# Patient Record
Sex: FEMALE | Race: WHITE | URBAN - METROPOLITAN AREA
[De-identification: names, ages, dates, MRNs, and addresses within clinical notes are randomized per-mention and may not be internally consistent; named-entity substitution may affect disease eponyms.]

---

## 2023-08-17 ENCOUNTER — APPOINTMENT (RX ONLY)
Dept: URBAN - METROPOLITAN AREA CLINIC 325 | Facility: CLINIC | Age: 68
Setting detail: DERMATOLOGY
End: 2023-08-17

## 2023-08-17 DIAGNOSIS — L81.4 OTHER MELANIN HYPERPIGMENTATION: ICD-10-CM

## 2023-08-17 DIAGNOSIS — L28.1 PRURIGO NODULARIS: ICD-10-CM

## 2023-08-17 PROCEDURE — ? PRESCRIPTION

## 2023-08-17 PROCEDURE — ? MEDICAL CONSULTATION: CHEMICAL PEELS

## 2023-08-17 PROCEDURE — 99203 OFFICE O/P NEW LOW 30 MIN: CPT

## 2023-08-17 PROCEDURE — ? COSMETIC CONSULTATION: IPL

## 2023-08-17 PROCEDURE — ? COUNSELING

## 2023-08-17 PROCEDURE — ? PRODUCT LINE (OFFICE PRODUCTS)

## 2023-08-17 RX ORDER — SILVER SULFADIAZINE 10 MG/G
CREAM TOPICAL
Qty: 50 | Refills: 2 | Status: ERX | COMMUNITY
Start: 2023-08-17

## 2023-08-17 RX ORDER — TRIAMCINOLONE ACETONIDE 1 MG/G
CREAM TOPICAL BID
Qty: 454 | Refills: 0 | Status: ERX | COMMUNITY
Start: 2023-08-17

## 2023-08-17 RX ADMIN — TRIAMCINOLONE ACETONIDE: 1 CREAM TOPICAL at 00:00

## 2023-08-17 RX ADMIN — SILVER SULFADIAZINE: 10 CREAM TOPICAL at 00:00

## 2023-08-17 ASSESSMENT — LOCATION SIMPLE DESCRIPTION DERM
LOCATION SIMPLE: RIGHT FOREARM
LOCATION SIMPLE: LEFT PRETIBIAL REGION
LOCATION SIMPLE: INFERIOR FOREHEAD
LOCATION SIMPLE: RIGHT CHEEK
LOCATION SIMPLE: NOSE
LOCATION SIMPLE: RIGHT FOREHEAD
LOCATION SIMPLE: LEFT CHEEK
LOCATION SIMPLE: LEFT FOREHEAD
LOCATION SIMPLE: RIGHT PRETIBIAL REGION
LOCATION SIMPLE: CHIN
LOCATION SIMPLE: LEFT FOREARM

## 2023-08-17 ASSESSMENT — LOCATION DETAILED DESCRIPTION DERM
LOCATION DETAILED: RIGHT PROXIMAL RADIAL DORSAL FOREARM
LOCATION DETAILED: LEFT PROXIMAL DORSAL FOREARM
LOCATION DETAILED: INFERIOR MID FOREHEAD
LOCATION DETAILED: RIGHT INFERIOR CENTRAL MALAR CHEEK
LOCATION DETAILED: RIGHT PROXIMAL PRETIBIAL REGION
LOCATION DETAILED: LEFT CHIN
LOCATION DETAILED: NASAL SUPRATIP
LOCATION DETAILED: LEFT MEDIAL FOREHEAD
LOCATION DETAILED: LEFT INFERIOR CENTRAL MALAR CHEEK
LOCATION DETAILED: NASAL DORSUM
LOCATION DETAILED: RIGHT CHIN
LOCATION DETAILED: LEFT PROXIMAL PRETIBIAL REGION
LOCATION DETAILED: RIGHT INFERIOR MEDIAL FOREHEAD
LOCATION DETAILED: RIGHT CENTRAL MALAR CHEEK

## 2023-08-17 ASSESSMENT — LOCATION ZONE DERM
LOCATION ZONE: FACE
LOCATION ZONE: ARM
LOCATION ZONE: NOSE
LOCATION ZONE: LEG

## 2023-08-17 NOTE — PROCEDURE: MIPS QUALITY
Quality 431: Preventive Care And Screening: Unhealthy Alcohol Use - Screening: Patient not identified as an unhealthy alcohol user when screened for unhealthy alcohol use using a systematic screening method
Quality 137: Melanoma: Continuity Of Care - Recall System: Recall system not utilized, reason not otherwise specified
Quality 47: Advance Care Plan: Advance care planning not documented, reason not otherwise specified.
Quality 226: Preventive Care And Screening: Tobacco Use: Screening And Cessation Intervention: Patient screened for tobacco use, is a smoker AND received Cessation Counseling within measurement period or in the six months prior to the measurement period
Quality 138: Melanoma: Coordination Of Care: The patient does not have a PCP or referring physician.
Detail Level: Detailed

## 2023-08-17 NOTE — HPI: EVALUATION OF SKIN LESION(S)
Hpi Title: Evaluation of Skin Lesions
Have Your Spot(S) Been Treated In The Past?: has not been treated
Additional History: PREFERS TO CHECK ARMS AND LOWERS LEGS ONLY

## 2023-08-17 NOTE — PROCEDURE: PRODUCT LINE (OFFICE PRODUCTS)
Product 71 Price (In Dollars - Numeric Only, No Special Characters Or $): 0.00
Product 11 Application Directions: Am/pm
Product 6 Units: 1
Product 62 Units: 0
Product 79 Price (In Dollars - Numeric Only, No Special Characters Or $): 65
Name Of Product 45: Brightening vitamin c pads
Name Of Product 32: Techno
Name Of Product 4: Power defense zo
Name Of Product 20: Skin recovery toner
Name Of Product 74: Uv sport
Product 1 Price (In Dollars - Numeric Only, No Special Characters Or $): 0.0
Detail Level: Zone
Name Of Product 12: Am therapy moisturizer
Name Of Product 53: Jerseyve
Name Of Product 40: Zo retinol 1%
Product 17 Application Directions: Am
Name Of Product 56: Rich body moisturizer Elta
Name Of Product 15: Exf polish
Name Of Product 43: Retinage 2.5x
Name Of Product 35: Rozetrol
Name Of Product 23: Hydrating cream zo
Name Of Product 29: Foaming Eltamd  wash
Name Of Product 77: Zo .5 retinol
Name Of Product 7: .5% retinol zo
Assigning Risk Information: Per AMA, level of risk is based upon consequences of the problem(s) addressed at the encounter when appropriately treated. Risk also includes medical decision making related to the need to initiate or forego further testing, treatment and/or hospitalization. Over the counter medication are assigned a risk level of low. Prescription medication management is assigned a risk level of moderate.
Name Of Product 10: SPF brush zo
Risk Of Complication Category: No MDM
Name Of Product 51: Zo daily skin care progra
Name Of Product 18: Skin recovery serum
Name Of Product 26: Pm therapy
Name Of Product 2: 5% zo retinol
Name Of Product 72: Rich body cream me
Name Of Product 80: 2.5 retinol Ressential
Name Of Product 66: Revision foaming wash
Name Of Product 21: Eye max
Name Of Product 61: Retinol skin  .5%
Name Of Product 46: Lumiquin
Allow Plan To Count Towards E/M Coding: Yes
Name Of Product 33: Eye gel Elta
Name Of Product 5: Wrinkle + Texture
Name Of Product 27: 10x retinage
Name Of Product 78: Elta lip balm
Name Of Product 30: Daily power defense
Name Of Product 41: Strataderm
Name Of Product 13: Pore refiner
Name Of Product 54: Uv daily tinted
Name Of Product 16: Intellishade spf/moisturizer
Name Of Product 24: Foaming wash
Name Of Product 64: Rozatrol
Name Of Product 8: UV daily
Name Of Product 59: Wound cleanser spray
Name Of Product 11: Spf brush
Name Of Product 44: Nectifirm
Name Of Product 52: Zo Jerseyve
Name Of Product 3: AlphaRet
Name Of Product 31: HQ 4% zo
Name Of Product 19: Skin recovery moisturizer
Name Of Product 67: Vitamin c pads
Name Of Product 73: Zo firming booster
Name Of Product 22: Intellishade clear
Name Of Product 42: Brightening pads
Name Of Product 34: HQ blending zo
Name Of Product 55: 15-15 urea cream
Name Of Product 62: Even tone SB
Name Of Product 28: Truphysical spf
Name Of Product 6: Vitamin c 30
Name Of Product 79: Eye perk
Name Of Product 9: Renew pads
Name Of Product 14: Truphysical
Name Of Product 25: Zo skin normalizing kit
Name Of Product 65: Lumiquin

## 2023-09-18 ENCOUNTER — APPOINTMENT (RX ONLY)
Dept: URBAN - METROPOLITAN AREA CLINIC 325 | Facility: CLINIC | Age: 68
Setting detail: DERMATOLOGY
End: 2023-09-18

## 2023-09-18 DIAGNOSIS — L28.1 PRURIGO NODULARIS: ICD-10-CM | Status: RESOLVING

## 2023-09-18 DIAGNOSIS — L81.0 POSTINFLAMMATORY HYPERPIGMENTATION: ICD-10-CM

## 2023-09-18 PROCEDURE — ? FULL BODY SKIN EXAM - DECLINED

## 2023-09-18 PROCEDURE — 99213 OFFICE O/P EST LOW 20 MIN: CPT

## 2023-09-18 PROCEDURE — ? COUNSELING

## 2023-09-18 PROCEDURE — ? ADDITIONAL NOTES

## 2023-09-18 ASSESSMENT — LOCATION SIMPLE DESCRIPTION DERM: LOCATION SIMPLE: LEFT FOREARM

## 2023-09-18 ASSESSMENT — LOCATION DETAILED DESCRIPTION DERM: LOCATION DETAILED: LEFT DISTAL DORSAL FOREARM

## 2023-09-18 ASSESSMENT — LOCATION ZONE DERM: LOCATION ZONE: ARM

## 2023-09-18 NOTE — PROCEDURE: MIPS QUALITY
Quality 130: Documentation Of Current Medications In The Medical Record: Current Medications Documented
Quality 431: Preventive Care And Screening: Unhealthy Alcohol Use - Screening: Patient not identified as an unhealthy alcohol user when screened for unhealthy alcohol use using a systematic screening method
Quality 402: Tobacco Use And Help With Quitting Among Adolescents: Patient screened for tobacco and never smoked
Quality 137: Melanoma: Continuity Of Care - Recall System: Recall system not utilized, reason not otherwise specified
Quality 47: Advance Care Plan: Advance care planning not documented, reason not otherwise specified.
Quality 410: Psoriasis Clinical Response To Oral Systemic Or Biologic Mediations: Psoriasis Assessment Tool NOT Documented
Quality 226: Preventive Care And Screening: Tobacco Use: Screening And Cessation Intervention: Patient screened for tobacco use, is a smoker AND received Cessation Counseling within measurement period or in the six months prior to the measurement period
Quality 138: Melanoma: Coordination Of Care: The patient does not have a PCP or referring physician.
Detail Level: Detailed

## 2023-09-18 NOTE — PROCEDURE: ADDITIONAL NOTES
Additional Notes: APPLY TRIAMCINOLONE OR VASELINE
Detail Level: Detailed
Render Risk Assessment In Note?: no